# Patient Record
Sex: MALE | Race: ASIAN | Employment: FULL TIME | URBAN - METROPOLITAN AREA
[De-identification: names, ages, dates, MRNs, and addresses within clinical notes are randomized per-mention and may not be internally consistent; named-entity substitution may affect disease eponyms.]

---

## 2021-08-30 ENCOUNTER — OFFICE VISIT (OUTPATIENT)
Dept: FAMILY MEDICINE CLINIC | Facility: CLINIC | Age: 36
End: 2021-08-30
Payer: COMMERCIAL

## 2021-08-30 VITALS
BODY MASS INDEX: 26.95 KG/M2 | DIASTOLIC BLOOD PRESSURE: 71 MMHG | HEIGHT: 72 IN | WEIGHT: 199 LBS | HEART RATE: 62 BPM | SYSTOLIC BLOOD PRESSURE: 110 MMHG | TEMPERATURE: 98 F

## 2021-08-30 DIAGNOSIS — Z87.39 HISTORY OF SCOLIOSIS: ICD-10-CM

## 2021-08-30 DIAGNOSIS — M25.511 ACUTE PAIN OF RIGHT SHOULDER: Primary | ICD-10-CM

## 2021-08-30 PROCEDURE — 3074F SYST BP LT 130 MM HG: CPT | Performed by: STUDENT IN AN ORGANIZED HEALTH CARE EDUCATION/TRAINING PROGRAM

## 2021-08-30 PROCEDURE — 3078F DIAST BP <80 MM HG: CPT | Performed by: STUDENT IN AN ORGANIZED HEALTH CARE EDUCATION/TRAINING PROGRAM

## 2021-08-30 PROCEDURE — 99202 OFFICE O/P NEW SF 15 MIN: CPT | Performed by: STUDENT IN AN ORGANIZED HEALTH CARE EDUCATION/TRAINING PROGRAM

## 2021-08-30 PROCEDURE — 3008F BODY MASS INDEX DOCD: CPT | Performed by: STUDENT IN AN ORGANIZED HEALTH CARE EDUCATION/TRAINING PROGRAM

## 2021-08-30 NOTE — PROGRESS NOTES
HPI:    Patient ID: Silverio Cordero is a 29year old male. HPI  Pt presenting with Right shoulder pain. He admits to chronic posture issues related to h/o scoliosis. For the last few weeks, he complains of limited Right shoulder ROM due to pain.  Mehul Hernandez with plan. No orders of the defined types were placed in this encounter.       Meds This Visit:  Requested Prescriptions      No prescriptions requested or ordered in this encounter       Imaging & Referrals:  PHYSICAL THERAPY - INTERNAL         FQ#[id number]

## 2021-09-14 ENCOUNTER — TELEPHONE (OUTPATIENT)
Dept: PHYSICAL THERAPY | Facility: HOSPITAL | Age: 36
End: 2021-09-14

## 2021-09-16 ENCOUNTER — OFFICE VISIT (OUTPATIENT)
Dept: PHYSICAL THERAPY | Facility: HOSPITAL | Age: 36
End: 2021-09-16
Attending: STUDENT IN AN ORGANIZED HEALTH CARE EDUCATION/TRAINING PROGRAM
Payer: COMMERCIAL

## 2021-09-16 DIAGNOSIS — M25.511 ACUTE PAIN OF RIGHT SHOULDER: ICD-10-CM

## 2021-09-16 DIAGNOSIS — Z87.39 HISTORY OF SCOLIOSIS: ICD-10-CM

## 2021-09-16 PROCEDURE — 97161 PT EVAL LOW COMPLEX 20 MIN: CPT

## 2021-09-16 PROCEDURE — 97112 NEUROMUSCULAR REEDUCATION: CPT

## 2021-09-16 NOTE — PROGRESS NOTES
PHYSICAL THERAPY EVALUATION:   Referring Physician: Dr. Bhupinder Palacios  Diagnosis: right shoulder pain      Date of Service: 9/16/2021     PATIENT SUMMARY   Precautions: standard    Relevant imaging findings: no imaging.     History of present illness/condition forearms from the elbow to the wrist but not in the hand  Description: tight band, tension  -Superficial vs. Deep?  Superficial   -Clicking, locking, catching? no  24-hour pattern: no  Current: 7-8/10  Best: 0/10  Worst: 7-8/10  Irritability: low, symptoms child but has outgrown this as an adult.    Hx of cancer: personal hx- no. Several aunts with breast cancer  Hx of fracture: right thumb and pinky during basketball and football when he was younger  Hx of trauma to neck or back: no   Hx of smoking: no    Pa curve has anterior-posterior component with thoracic lordotic curve (increased concavity) and medio-lateral component with convexity to the right (see rib hump on the right)   -Tendency for slight slumped sitting positioning in unsupported sitting posture overpressure  WNL all planes of motion no reproduction of UE symptoms with overpressure including extension quadrant SANA    Joint Accessory motion  GHJ- did not perform  SCJ- AP, inferior glide normal SANA, no reproduction of UE symptoms  ACJ- AP, inferior 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 Signs and symptoms are stemming from bilateral abnormal neurodynamics of the radial, ulnar, and median nerve.  Contributing impairments seem to be mainly stemming from the t inflammatory condition in light of family history of rheumatoid arthritis, insidious onset, worsening over time, report of unusual fatigue over past 6 months, age and gender so if the patient fails to improve over the expected time frame or worsens despite neurodynamics SANA to restore ability to reach overhead. • The patient will restore full shoulder ABD to at least 170 degrees to restore ability to reach overhead.   • The patient will adjust workstation [employer] to promote improved posture during day in

## 2021-09-22 ENCOUNTER — OFFICE VISIT (OUTPATIENT)
Dept: PHYSICAL THERAPY | Facility: HOSPITAL | Age: 36
End: 2021-09-22
Attending: STUDENT IN AN ORGANIZED HEALTH CARE EDUCATION/TRAINING PROGRAM
Payer: COMMERCIAL

## 2021-09-22 PROCEDURE — 97110 THERAPEUTIC EXERCISES: CPT

## 2021-09-22 NOTE — PROGRESS NOTES
PHYSICAL THERAPY DAILY TREATMENT NOTE  Precautions: standard/universal  Fall risk: standard  Date of Evaluation: 9/16/21  Diagnosis: Right shoulder pain      Insurance Type (# Auth): [facility] PPO  Visit #: 2         Subjective: Greta Tobar reports he goals.    Plan for next visit: thoracic extension over foam roller, scapular strengthening, core stabilization re-training      Home Exercise Program  · Median nerve neurodynamic exercise with therapist performing wrist flexion and extension with JORGE LUIS carver

## 2021-10-01 ENCOUNTER — OFFICE VISIT (OUTPATIENT)
Dept: PHYSICAL THERAPY | Facility: HOSPITAL | Age: 36
End: 2021-10-01
Attending: STUDENT IN AN ORGANIZED HEALTH CARE EDUCATION/TRAINING PROGRAM
Payer: COMMERCIAL

## 2021-10-01 PROCEDURE — 97110 THERAPEUTIC EXERCISES: CPT

## 2021-10-01 NOTE — PROGRESS NOTES
PHYSICAL THERAPY DAILY TREATMENT NOTE  Precautions: standard/universal  Fall risk: standard  Date of Evaluation: 9/16/21  Diagnosis: Right shoulder pain      Insurance Type (# Auth): [facility] PPO  Visit #: 3         Subjective: Greta Tobar reports he next visit: look at right hip, core stabilization training, squat re-training       Home Exercise Program  o Open book -  Repeat 10 Times, Perform 2 Times a Day  o Bilateral ABD on foam roller  -  Repeat 10 Times, Perform 2 Times a Day  o Unilateral flexio Treatment Time: 45 minutes  Charges: 3 therex

## 2021-10-05 ENCOUNTER — OFFICE VISIT (OUTPATIENT)
Dept: PHYSICAL THERAPY | Facility: HOSPITAL | Age: 36
End: 2021-10-05
Attending: STUDENT IN AN ORGANIZED HEALTH CARE EDUCATION/TRAINING PROGRAM
Payer: COMMERCIAL

## 2021-10-05 PROCEDURE — 97110 THERAPEUTIC EXERCISES: CPT

## 2021-10-05 PROCEDURE — 97140 MANUAL THERAPY 1/> REGIONS: CPT

## 2021-10-05 NOTE — PROGRESS NOTES
PHYSICAL THERAPY DAILY TREATMENT NOTE  Precautions: standard/universal  Fall risk: standard  Date of Evaluation: 9/16/21  Diagnosis: Right shoulder pain      Insurance Type (# Auth): [facility] PPO  Visit #: 4         Subjective: Greta Tobar reports exe hip flexion mobility during the movement. Silverio Cordero will continue to benefit from skilled physical therapy interventions to improve function and achieve all established physical therapy goals.     Plan for next visit: progress right hip mobility, co restore ability to reach overhead. · The patient will adjust workstation [employer] to promote improved posture during day in his cervical and thoracic spine.   · The patient will improve central CTJ and thoracic mobility to restore thoracic spine AROM in

## 2021-10-06 ENCOUNTER — APPOINTMENT (OUTPATIENT)
Dept: PHYSICAL THERAPY | Facility: HOSPITAL | Age: 36
End: 2021-10-06
Attending: STUDENT IN AN ORGANIZED HEALTH CARE EDUCATION/TRAINING PROGRAM
Payer: COMMERCIAL

## 2021-10-12 ENCOUNTER — APPOINTMENT (OUTPATIENT)
Dept: PHYSICAL THERAPY | Facility: HOSPITAL | Age: 36
End: 2021-10-12
Attending: STUDENT IN AN ORGANIZED HEALTH CARE EDUCATION/TRAINING PROGRAM
Payer: COMMERCIAL

## 2021-10-13 ENCOUNTER — TELEPHONE (OUTPATIENT)
Dept: PHYSICAL THERAPY | Facility: HOSPITAL | Age: 36
End: 2021-10-13

## 2021-10-14 ENCOUNTER — APPOINTMENT (OUTPATIENT)
Dept: PHYSICAL THERAPY | Facility: HOSPITAL | Age: 36
End: 2021-10-14
Attending: STUDENT IN AN ORGANIZED HEALTH CARE EDUCATION/TRAINING PROGRAM
Payer: COMMERCIAL

## 2021-10-18 ENCOUNTER — OFFICE VISIT (OUTPATIENT)
Dept: PHYSICAL THERAPY | Facility: HOSPITAL | Age: 36
End: 2021-10-18
Attending: STUDENT IN AN ORGANIZED HEALTH CARE EDUCATION/TRAINING PROGRAM
Payer: COMMERCIAL

## 2021-10-18 PROCEDURE — 97140 MANUAL THERAPY 1/> REGIONS: CPT

## 2021-10-18 PROCEDURE — 97110 THERAPEUTIC EXERCISES: CPT

## 2021-10-18 NOTE — PROGRESS NOTES
PHYSICAL THERAPY DAILY TREATMENT NOTE  Precautions: standard/universal  Fall risk: standard  Date of Evaluation: 9/16/21  Diagnosis: Right shoulder pain      Insurance Type (# Auth): [facility] PPO  Visit #: 5         Subjective: Greta Tobar reports his return to the gym to perform olympic lifts including squat and deadlifts.  Hip flexion plane progressing well, hip ER still limited and requires more manual therapy but did add in more sustained stretching for hip ER for home to promote continued gains in m - CHAIR AS GUIDE (not weighted) -  Repeat 20 Times, Hold 1 Second(s), Complete 1 Set, Perform 1 Times a Day 20-30 lbs    Physical Therapy Goals  Goals: (to be met in 6-8 weeks)  · The patient will normalize median nerve neurodynamics SANA to restore ability

## 2021-10-20 ENCOUNTER — APPOINTMENT (OUTPATIENT)
Dept: PHYSICAL THERAPY | Facility: HOSPITAL | Age: 36
End: 2021-10-20
Attending: STUDENT IN AN ORGANIZED HEALTH CARE EDUCATION/TRAINING PROGRAM
Payer: COMMERCIAL

## 2021-10-22 ENCOUNTER — APPOINTMENT (OUTPATIENT)
Dept: PHYSICAL THERAPY | Facility: HOSPITAL | Age: 36
End: 2021-10-22
Attending: STUDENT IN AN ORGANIZED HEALTH CARE EDUCATION/TRAINING PROGRAM
Payer: COMMERCIAL

## 2021-10-25 ENCOUNTER — OFFICE VISIT (OUTPATIENT)
Dept: PHYSICAL THERAPY | Facility: HOSPITAL | Age: 36
End: 2021-10-25
Attending: STUDENT IN AN ORGANIZED HEALTH CARE EDUCATION/TRAINING PROGRAM
Payer: COMMERCIAL

## 2021-10-25 PROCEDURE — 97140 MANUAL THERAPY 1/> REGIONS: CPT

## 2021-10-25 PROCEDURE — 97110 THERAPEUTIC EXERCISES: CPT

## 2021-10-25 NOTE — PROGRESS NOTES
PHYSICAL THERAPY DAILY TREATMENT NOTE  Precautions: standard/universal  Fall risk: standard  Date of Evaluation: 9/16/21  Diagnosis: Right shoulder pain      Insurance Type (# Auth): [facility] PPO  Visit #: 6         Subjective: Greta Tobar reports hip exercises in the gym. Hip flexion and ER planes progressing well with us able to progress to increased grade mobilization in more wound up flexion+ER biased position.  Corbin Schroedercayetano will continue to benefit from skilled physical therapy interventions to weeks)  · The patient will normalize median nerve neurodynamics SANA to restore ability to reach overhead. MET  · The patient will normalized radial nerve neurodynamics SANA to restore ability to reach overhead.  MET  · The patient will normalized ulnar nerve

## 2021-10-27 ENCOUNTER — OFFICE VISIT (OUTPATIENT)
Dept: PHYSICAL THERAPY | Facility: HOSPITAL | Age: 36
End: 2021-10-27
Attending: STUDENT IN AN ORGANIZED HEALTH CARE EDUCATION/TRAINING PROGRAM
Payer: COMMERCIAL

## 2021-10-27 PROCEDURE — 97110 THERAPEUTIC EXERCISES: CPT

## 2021-10-27 PROCEDURE — 97140 MANUAL THERAPY 1/> REGIONS: CPT

## 2021-10-27 NOTE — PROGRESS NOTES
PHYSICAL THERAPY DAILY TREATMENT NOTE  Precautions: standard/universal  Fall risk: standard  Date of Evaluation: 9/16/21  Diagnosis: Right shoulder pain      Insurance Type (# Auth): [facility] PPO  Visit #: 6         Subjective: Greta Tobar reports he discharged from PT.        Home Exercise Program  o Open book -  Repeat 10 Times, Perform 2 Times a Day  o Bilateral ABD on foam roller  -  Repeat 10 Times, Perform 2 Times a Day  o Unilateral flexion on Foam Roller -  Repeat 10 Times, Perform 2 Times a Day ability to reach overhead. MET  · The patient will restore full shoulder ABD to at least 170 degrees to restore ability to reach overhead.  MET  · The patient will adjust workstation [employer] to promote improved posture during day in his cervical and thor

## 2022-08-31 ENCOUNTER — MED REC SCAN ONLY (OUTPATIENT)
Dept: FAMILY MEDICINE CLINIC | Facility: CLINIC | Age: 37
End: 2022-08-31

## 2023-09-25 PROBLEM — J45.909 ASTHMA, ALLERGIC: Status: ACTIVE | Noted: 2023-09-25

## 2023-09-27 ENCOUNTER — OFFICE VISIT (OUTPATIENT)
Dept: FAMILY MEDICINE CLINIC | Facility: CLINIC | Age: 38
End: 2023-09-27

## 2023-09-27 ENCOUNTER — LAB ENCOUNTER (OUTPATIENT)
Dept: LAB | Age: 38
End: 2023-09-27
Attending: STUDENT IN AN ORGANIZED HEALTH CARE EDUCATION/TRAINING PROGRAM
Payer: COMMERCIAL

## 2023-09-27 VITALS
WEIGHT: 201.38 LBS | TEMPERATURE: 97 F | DIASTOLIC BLOOD PRESSURE: 71 MMHG | SYSTOLIC BLOOD PRESSURE: 113 MMHG | HEART RATE: 73 BPM | BODY MASS INDEX: 27.27 KG/M2 | OXYGEN SATURATION: 96 % | HEIGHT: 72 IN

## 2023-09-27 DIAGNOSIS — Z00.00 WELL ADULT EXAM: ICD-10-CM

## 2023-09-27 DIAGNOSIS — Z00.00 WELL ADULT EXAM: Primary | ICD-10-CM

## 2023-09-27 LAB
ALBUMIN SERPL-MCNC: 3.7 G/DL (ref 3.4–5)
ALBUMIN/GLOB SERPL: 0.9 {RATIO} (ref 1–2)
ALP LIVER SERPL-CCNC: 65 U/L
ALT SERPL-CCNC: 27 U/L
ANION GAP SERPL CALC-SCNC: 6 MMOL/L (ref 0–18)
AST SERPL-CCNC: 16 U/L (ref 15–37)
BILIRUB SERPL-MCNC: 0.4 MG/DL (ref 0.1–2)
BUN BLD-MCNC: 15 MG/DL (ref 7–18)
BUN/CREAT SERPL: 12.7 (ref 10–20)
CALCIUM BLD-MCNC: 9.3 MG/DL (ref 8.5–10.1)
CHLORIDE SERPL-SCNC: 107 MMOL/L (ref 98–112)
CHOLEST SERPL-MCNC: 216 MG/DL (ref ?–200)
CO2 SERPL-SCNC: 26 MMOL/L (ref 21–32)
CREAT BLD-MCNC: 1.18 MG/DL
DEPRECATED RDW RBC AUTO: 37.6 FL (ref 35.1–46.3)
EGFRCR SERPLBLD CKD-EPI 2021: 82 ML/MIN/1.73M2 (ref 60–?)
ERYTHROCYTE [DISTWIDTH] IN BLOOD BY AUTOMATED COUNT: 13.2 % (ref 11–15)
EST. AVERAGE GLUCOSE BLD GHB EST-MCNC: 108 MG/DL (ref 68–126)
FASTING PATIENT LIPID ANSWER: NO
FASTING STATUS PATIENT QL REPORTED: NO
GLOBULIN PLAS-MCNC: 4.2 G/DL (ref 2.8–4.4)
GLUCOSE BLD-MCNC: 104 MG/DL (ref 70–99)
HBA1C MFR BLD: 5.4 % (ref ?–5.7)
HCT VFR BLD AUTO: 43.6 %
HDLC SERPL-MCNC: 44 MG/DL (ref 40–59)
HGB BLD-MCNC: 14.1 G/DL
LDLC SERPL CALC-MCNC: 134 MG/DL (ref ?–100)
MCH RBC QN AUTO: 25.8 PG (ref 26–34)
MCHC RBC AUTO-ENTMCNC: 32.3 G/DL (ref 31–37)
MCV RBC AUTO: 79.9 FL
NONHDLC SERPL-MCNC: 172 MG/DL (ref ?–130)
OSMOLALITY SERPL CALC.SUM OF ELEC: 289 MOSM/KG (ref 275–295)
PLATELET # BLD AUTO: 165 10(3)UL (ref 150–450)
POTASSIUM SERPL-SCNC: 4.2 MMOL/L (ref 3.5–5.1)
PROT SERPL-MCNC: 7.9 G/DL (ref 6.4–8.2)
RBC # BLD AUTO: 5.46 X10(6)UL
SODIUM SERPL-SCNC: 139 MMOL/L (ref 136–145)
TRIGL SERPL-MCNC: 215 MG/DL (ref 30–149)
TSI SER-ACNC: 1.6 MIU/ML (ref 0.36–3.74)
VLDLC SERPL CALC-MCNC: 40 MG/DL (ref 0–30)
WBC # BLD AUTO: 5 X10(3) UL (ref 4–11)

## 2023-09-27 PROCEDURE — 3008F BODY MASS INDEX DOCD: CPT | Performed by: STUDENT IN AN ORGANIZED HEALTH CARE EDUCATION/TRAINING PROGRAM

## 2023-09-27 PROCEDURE — 84443 ASSAY THYROID STIM HORMONE: CPT

## 2023-09-27 PROCEDURE — 80061 LIPID PANEL: CPT

## 2023-09-27 PROCEDURE — 36415 COLL VENOUS BLD VENIPUNCTURE: CPT

## 2023-09-27 PROCEDURE — 85027 COMPLETE CBC AUTOMATED: CPT

## 2023-09-27 PROCEDURE — 3078F DIAST BP <80 MM HG: CPT | Performed by: STUDENT IN AN ORGANIZED HEALTH CARE EDUCATION/TRAINING PROGRAM

## 2023-09-27 PROCEDURE — 83036 HEMOGLOBIN GLYCOSYLATED A1C: CPT

## 2023-09-27 PROCEDURE — 80053 COMPREHEN METABOLIC PANEL: CPT

## 2023-09-27 PROCEDURE — 99395 PREV VISIT EST AGE 18-39: CPT | Performed by: STUDENT IN AN ORGANIZED HEALTH CARE EDUCATION/TRAINING PROGRAM

## 2023-09-27 PROCEDURE — 3074F SYST BP LT 130 MM HG: CPT | Performed by: STUDENT IN AN ORGANIZED HEALTH CARE EDUCATION/TRAINING PROGRAM
